# Patient Record
Sex: FEMALE | Race: WHITE | Employment: UNEMPLOYED | URBAN - METROPOLITAN AREA
[De-identification: names, ages, dates, MRNs, and addresses within clinical notes are randomized per-mention and may not be internally consistent; named-entity substitution may affect disease eponyms.]

---
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
## 2024-07-23 NOTE — BH NOTES
Heladio Notes: pt has been quiet but approachable by staff and peers. Pt has been compliant with groups, meals and unit rules. Pt has interacted with peers appropriately. Pt has not voiced self harm or to others. Pt was not a behavior problem and will continue to monitored for safety precautions and locations. What Type Of Note Output Would You Prefer (Optional)?: Standard Output How Severe Is Your Skin Lesion?: mild Has Your Skin Lesion Been Treated?: not been treated Is This A New Presentation, Or A Follow-Up?: Skin Lesion